# Patient Record
Sex: MALE | Race: WHITE | Employment: UNEMPLOYED | URBAN - METROPOLITAN AREA
[De-identification: names, ages, dates, MRNs, and addresses within clinical notes are randomized per-mention and may not be internally consistent; named-entity substitution may affect disease eponyms.]

---

## 2018-05-25 ENCOUNTER — TELEPHONE (OUTPATIENT)
Dept: INTERNAL MEDICINE CLINIC | Age: 53
End: 2018-05-25

## 2018-05-25 DIAGNOSIS — Z00.00 PHYSICAL EXAM: Primary | ICD-10-CM

## 2018-07-31 ENCOUNTER — HOSPITAL ENCOUNTER (OUTPATIENT)
Dept: LAB | Age: 53
Discharge: HOME | End: 2018-07-31
Payer: COMMERCIAL

## 2018-07-31 DIAGNOSIS — Z00.00 PHYSICAL EXAM: ICD-10-CM

## 2018-07-31 LAB
ALBUMIN SERPL-MCNC: 4 G/DL (ref 3.4–5)
ALBUMIN/GLOB SERPL: 1.3 {RATIO} (ref 0.8–1.7)
ALP SERPL-CCNC: 69 U/L (ref 45–117)
ALT SERPL-CCNC: 32 U/L (ref 16–61)
ANION GAP SERPL CALC-SCNC: 6 MMOL/L (ref 3–18)
AST SERPL-CCNC: 15 U/L (ref 15–37)
BILIRUB SERPL-MCNC: 0.5 MG/DL (ref 0.2–1)
BUN SERPL-MCNC: 24 MG/DL (ref 7–18)
BUN/CREAT SERPL: 22 (ref 12–20)
CALCIUM SERPL-MCNC: 8.9 MG/DL (ref 8.5–10.1)
CHLORIDE SERPL-SCNC: 106 MMOL/L (ref 100–108)
CHOLEST SERPL-MCNC: 187 MG/DL
CO2 SERPL-SCNC: 28 MMOL/L (ref 21–32)
CREAT SERPL-MCNC: 1.08 MG/DL (ref 0.6–1.3)
ERYTHROCYTE [DISTWIDTH] IN BLOOD BY AUTOMATED COUNT: 13.2 % (ref 11.6–14.5)
GLOBULIN SER CALC-MCNC: 3.1 G/DL (ref 2–4)
GLUCOSE SERPL-MCNC: 88 MG/DL (ref 74–99)
HCT VFR BLD AUTO: 41.9 % (ref 36–48)
HDLC SERPL-MCNC: 43 MG/DL (ref 40–60)
HDLC SERPL: 4.3 {RATIO} (ref 0–5)
HGB BLD-MCNC: 15.1 G/DL (ref 13–16)
LDLC SERPL CALC-MCNC: 124.8 MG/DL (ref 0–100)
LIPID PROFILE,FLP: ABNORMAL
MCH RBC QN AUTO: 30.8 PG (ref 24–34)
MCHC RBC AUTO-ENTMCNC: 36 G/DL (ref 31–37)
MCV RBC AUTO: 85.5 FL (ref 74–97)
PLATELET # BLD AUTO: 267 K/UL (ref 135–420)
PMV BLD AUTO: 10.5 FL (ref 9.2–11.8)
POTASSIUM SERPL-SCNC: 4.3 MMOL/L (ref 3.5–5.5)
PROT SERPL-MCNC: 7.1 G/DL (ref 6.4–8.2)
RBC # BLD AUTO: 4.9 M/UL (ref 4.7–5.5)
SODIUM SERPL-SCNC: 140 MMOL/L (ref 136–145)
TRIGL SERPL-MCNC: 96 MG/DL (ref ?–150)
VLDLC SERPL CALC-MCNC: 19.2 MG/DL
WBC # BLD AUTO: 6.2 K/UL (ref 4.6–13.2)

## 2018-07-31 PROCEDURE — 36415 COLL VENOUS BLD VENIPUNCTURE: CPT | Performed by: INTERNAL MEDICINE

## 2018-07-31 PROCEDURE — 80053 COMPREHEN METABOLIC PANEL: CPT | Performed by: INTERNAL MEDICINE

## 2018-07-31 PROCEDURE — 85027 COMPLETE CBC AUTOMATED: CPT | Performed by: INTERNAL MEDICINE

## 2018-07-31 PROCEDURE — 80061 LIPID PANEL: CPT | Performed by: INTERNAL MEDICINE

## 2018-08-10 ENCOUNTER — OFFICE VISIT (OUTPATIENT)
Dept: INTERNAL MEDICINE CLINIC | Age: 53
End: 2018-08-10

## 2018-08-10 VITALS
WEIGHT: 169 LBS | BODY MASS INDEX: 25.61 KG/M2 | HEIGHT: 68 IN | RESPIRATION RATE: 14 BRPM | HEART RATE: 68 BPM | DIASTOLIC BLOOD PRESSURE: 82 MMHG | SYSTOLIC BLOOD PRESSURE: 112 MMHG | TEMPERATURE: 98.2 F | OXYGEN SATURATION: 98 %

## 2018-08-10 DIAGNOSIS — F43.9 STRESS: ICD-10-CM

## 2018-08-10 DIAGNOSIS — D12.6 COLON ADENOMA: ICD-10-CM

## 2018-08-10 DIAGNOSIS — E55.9 HYPOVITAMINOSIS D: ICD-10-CM

## 2018-08-10 DIAGNOSIS — I10 PRIMARY HYPERTENSION: Primary | ICD-10-CM

## 2018-08-10 DIAGNOSIS — N52.9 ERECTILE DYSFUNCTION, UNSPECIFIED ERECTILE DYSFUNCTION TYPE: ICD-10-CM

## 2018-08-10 DIAGNOSIS — R97.20 ELEVATED PROSTATE SPECIFIC ANTIGEN (PSA): ICD-10-CM

## 2018-08-10 DIAGNOSIS — E66.3 OVERWEIGHT (BMI 25.0-29.9): ICD-10-CM

## 2018-08-10 RX ORDER — ACYCLOVIR 800 MG/1
800 TABLET ORAL AS NEEDED
COMMUNITY
End: 2019-08-05 | Stop reason: SDUPTHER

## 2018-08-10 RX ORDER — BISMUTH SUBSALICYLATE 262 MG
1 TABLET,CHEWABLE ORAL DAILY
COMMUNITY

## 2018-08-10 RX ORDER — TADALAFIL 20 MG/1
20 TABLET ORAL AS NEEDED
Qty: 30 TAB | Refills: 11 | Status: SHIPPED | OUTPATIENT
Start: 2018-08-10

## 2018-08-10 RX ORDER — LORAZEPAM 1 MG/1
1 TABLET ORAL
Qty: 30 TAB | Refills: 1 | Status: SHIPPED | OUTPATIENT
Start: 2018-08-10 | End: 2019-02-02 | Stop reason: SDUPTHER

## 2018-08-10 NOTE — PROGRESS NOTES
48 y.o. WHITE OR  male who presents for evaluation. He is transferring care from Dr Yash Forde    Reports being dx'ed w HTN the last year or so and his readings have been controlled on armida and norvasc. No sfx. Remains active w a lot of walking with his job although not much other exercise    He has not been treated for his cholesterol, just doing dietary measures    He is doing the intermittent fasting after hearing about it from his wife and weight is down about 8 lbs on his scale    Vitals 8/10/2018 2/6/2018 12/1/2014   Weight 169 lb 180 lb 180 lb     No gi complaints. No gu complaints. His prostate is being followed currently by Dr Yuly Alcaraz. Wants refill of the cialis    Uses the ativan prn stress, very sparingly    Past Medical History:   Diagnosis Date    Colon adenoma 05/2017    Dr Talat Blank Elevated PSA     Dr Hamilton Stage Erectile dysfunction     viagra ineffective    HSV infection     Hyperlipidemia     calculated 10 yr risk score 3.8% (8/18)    Hypertension 2017    Hypovitaminosis D     Overweight (BMI 25.0-29. 9)     IF 6/18 start weight 177 lbs    Seizures ([address]     preteens; resolved     Past Surgical History:   Procedure Laterality Date    HX COLONOSCOPY      Dr Hema Harvey 5/30/17 adenoma    HX HEENT      Lasik early 2000s    HX ORTHOPAEDIC      Right bicep surgery 2008, Left bicep surgery 2011 Dr Stacie Reyes Marital status:      Spouse name: N/A    Number of children: 2    Years of education: N/A     Occupational History    physical science tech [employer]     Social History Main Topics    Smoking status: Never Smoker    Smokeless tobacco: Never Used    Alcohol use Yes      Comment: social    Drug use: No    Sexual activity: Yes     Partners: Female     Other Topics Concern    Not on file     Social History Narrative     Current Outpatient Prescriptions   Medication Sig    acyclovir (ZOVIRAX) 800 mg tablet Take 800 mg by mouth as needed.  LORazepam (ATIVAN) 1 mg tablet Take 1 Tab by mouth every eight (8) hours as needed for Anxiety. Max Daily Amount: 3 mg.  multivitamin (ONE A DAY) tablet Take 1 Tab by mouth daily.  tadalafil (CIALIS) 20 mg tablet Take 1 Tab by mouth as needed.  amLODIPine (NORVASC) 10 mg tablet Take 10 mg by mouth daily.  lisinopril (PRINIVIL, ZESTRIL) 10 mg tablet TK 1 T PO QD    VIT A/VIT C/VIT E/ZINC/COPPER (PRESERVISION AREDS PO) Take  by mouth.  ergocalciferol (ERGOCALCIFEROL) 50,000 unit capsule TK 1 C PO Q WK     No current facility-administered medications for this visit. Allergies   Allergen Reactions    Pcn [Penicillins] Hives    Vancomycin Swelling     REVIEW OF SYSTEMS: colo 2017 neg  Ophtho  no vision change or eye pain  Oral  no mouth pain, tongue or tooth problems  Ears  no hearing loss, ear pain, fullness, no swallowing problems  Cardiac  no CP, PND, orthopnea, edema, palpitations or syncope  Chest  no breast masses  Resp  no wheezing, chronic coughing, dyspnea  GI  no heartburn, nausea, vomiting, change in bowel habits, bleeding, hemorrhoids  Urinary  no dysuria, hematuria, flank pain, urgency, frequency  Genitals  no genital lesions, discharge, masses, ulceration, warts  Ortho  no swelling, dec ROM, myalgias  Derm  no nail abnormalities, rashes, lesions of note, hair loss  Psych  denies any anxiety or depression symptoms, no hallucinations or violent ideation  Constitutional  no wt loss, night sweats, unexplained fevers  Neuro  no focal weakness, numbness, paresthesias, incoordination, ataxia, involuntary movements  Endo - no polyuria, polydipsia, nocturia, hot flashes    Visit Vitals    /82    Pulse 68    Temp 98.2 °F (36.8 °C) (Oral)    Resp 14    Ht 5' 8\" (1.727 m)    Wt 169 lb (76.7 kg)    SpO2 98%    BMI 25.7 kg/m2   A&O x3  Affect is appropriate.   Mood stable  No apparent distress  Anicteric, no JVD, adenopathy or thyromegaly. No carotid bruits or radiated murmur  Lungs clear to auscultation, no wheezes or rales  Heart showed regular rate and rhythm. No murmur, rubs, gallops  Abdomen soft nontender, no hepatosplenomegaly or masses. Extremities without edema. Pulses 1-2+ symmetrically    LABS  From 9/13 showed   psa 1.85  From 9/14 showed   psa 1.70  From 11/15 showed psa 2.20, gluc 102, cr 1.10, gfr>60, alt 46, chol 206, tg 290, hdl 40, ldl-c 109, wbc 6.3, hb 16.6, plt 294, tsh 1.62, ft4 1.30, vit d 48.7    Results for orders placed or performed during the hospital encounter of 07/31/18   CBC W/O DIFF   Result Value Ref Range    WBC 6.2 4.6 - 13.2 K/uL    RBC 4.90 4.70 - 5.50 M/uL    HGB 15.1 13.0 - 16.0 g/dL    HCT 41.9 36.0 - 48.0 %    MCV 85.5 74.0 - 97.0 FL    MCH 30.8 24.0 - 34.0 PG    MCHC 36.0 31.0 - 37.0 g/dL    RDW 13.2 11.6 - 14.5 %    PLATELET 329 224 - 677 K/uL    MPV 10.5 9.2 - 11.8 FL   LIPID PANEL   Result Value Ref Range    LIPID PROFILE          Cholesterol, total 187 <200 MG/DL    Triglyceride 96 <150 MG/DL    HDL Cholesterol 43 40 - 60 MG/DL    LDL, calculated 124.8 (H) 0 - 100 MG/DL    VLDL, calculated 19.2 MG/DL    CHOL/HDL Ratio 4.3 0 - 5.0     METABOLIC PANEL, COMPREHENSIVE   Result Value Ref Range    Sodium 140 136 - 145 mmol/L    Potassium 4.3 3.5 - 5.5 mmol/L    Chloride 106 100 - 108 mmol/L    CO2 28 21 - 32 mmol/L    Anion gap 6 3.0 - 18 mmol/L    Glucose 88 74 - 99 mg/dL    BUN 24 (H) 7.0 - 18 MG/DL    Creatinine 1.08 0.6 - 1.3 MG/DL    BUN/Creatinine ratio 22 (H) 12 - 20      GFR est AA >60 >60 ml/min/1.73m2    GFR est non-AA >60 >60 ml/min/1.73m2    Calcium 8.9 8.5 - 10.1 MG/DL    Bilirubin, total 0.5 0.2 - 1.0 MG/DL    ALT (SGPT) 32 16 - 61 U/L    AST (SGOT) 15 15 - 37 U/L    Alk.  phosphatase 69 45 - 117 U/L    Protein, total 7.1 6.4 - 8.2 g/dL    Albumin 4.0 3.4 - 5.0 g/dL    Globulin 3.1 2.0 - 4.0 g/dL    A-G Ratio 1.3 0.8 - 1.7       Patient Active Problem List   Diagnosis Code    Elevated prostate specific antigen (PSA) R97.20    ED (erectile dysfunction) N52.9    Overweight (BMI 25.0-29. 9) E66.3    Primary hypertension I10    Hypovitaminosis D E55.9    Colon adenoma D12.6     Assessment and plan:  1. Elevated psa. F/U urology  2. ED. Script and website for [employer] given  3. Overweight. .intermittent fasting as he is doing  4. HTN. Continue current regimen. 5. Hypovit d. Check next draw  6. Colon adenoma. Fiber, colo 2022      RTC 8/19    Above conditions discussed at length and patient vocalized understanding.   All questions answered to patient satisfaction

## 2018-08-10 NOTE — PROGRESS NOTES
1. Have you been to the ER, urgent care clinic or hospitalized since your last visit? NO.     2. Have you seen or consulted any other health care providers outside of the [facility] since your last visit (Include any pap smears or colon screening)? NO      Do you have an Advanced Directive? YES    Would you like information on Advanced Directives?  NO    Chief Complaint   Patient presents with   [address]     with labs

## 2018-12-03 RX ORDER — LISINOPRIL 10 MG/1
10 TABLET ORAL DAILY
Qty: 90 TAB | Refills: 3 | Status: SHIPPED | OUTPATIENT
Start: 2018-12-03 | End: 2019-05-19 | Stop reason: SDUPTHER

## 2018-12-03 RX ORDER — AMLODIPINE BESYLATE 10 MG/1
10 TABLET ORAL DAILY
Qty: 90 TAB | Refills: 3 | Status: SHIPPED | OUTPATIENT
Start: 2018-12-03 | End: 2020-04-20 | Stop reason: SDUPTHER

## 2018-12-03 NOTE — TELEPHONE ENCOUNTER
Last Visit: 08/10/2018 with MD Susan Archuleta  Next Appointment: RTC 08/19    Requested Prescriptions     Pending Prescriptions Disp Refills    amLODIPine (NORVASC) 10 mg tablet 90 Tab 3     Sig: Take 1 Tab by mouth daily.  lisinopril (PRINIVIL, ZESTRIL) 10 mg tablet 90 Tab 3     Sig: Take 1 Tab by mouth daily.

## 2019-02-02 DIAGNOSIS — F43.9 STRESS: ICD-10-CM

## 2019-02-04 NOTE — TELEPHONE ENCOUNTER
Last ov 8/10/18  Last fill 8/10/18       Reviewed report generated by the [address] [place] [address]. Does not demonstrate aberrancies or inconsistencies with regard to the prescribing of controlled medications to this patient by other providers.

## 2019-02-05 RX ORDER — LORAZEPAM 1 MG/1
1 TABLET ORAL
Qty: 30 TAB | Refills: 1 | OUTPATIENT
Start: 2019-02-05 | End: 2019-02-07 | Stop reason: CLARIF

## 2019-02-07 RX ORDER — LORAZEPAM 0.5 MG/1
1 TABLET ORAL
Qty: 60 TAB | Refills: 0 | Status: SHIPPED | OUTPATIENT
Start: 2019-02-07 | End: 2020-02-17 | Stop reason: SDUPTHER

## 2019-05-20 RX ORDER — LISINOPRIL 10 MG/1
10 TABLET ORAL DAILY
Qty: 90 TAB | Refills: 3 | Status: SHIPPED | OUTPATIENT
Start: 2019-05-20 | End: 2020-04-20

## 2019-08-05 NOTE — TELEPHONE ENCOUNTER
----- Message from Sruthi Holder. Juan Fleming sent at 8/4/2019  9:32 AM EDT -----  Regarding: Prescription Question  Contact: [phone]  I need a refill of my Acyclovir 800 mg. I have 5 pills left. Can I have the refill sent to the [place] [facility] [address]. Their # is [phone]. If for some reason it can not be refilled can you please call me at [phone].   Thank you

## 2019-08-06 RX ORDER — ACYCLOVIR 800 MG/1
TABLET ORAL
Qty: 30 TAB | Refills: 2 | Status: SHIPPED | OUTPATIENT
Start: 2019-08-06

## 2019-10-30 ENCOUNTER — TELEPHONE (OUTPATIENT)
Dept: INTERNAL MEDICINE CLINIC | Age: 54
End: 2019-10-30

## 2019-10-30 DIAGNOSIS — Z00.00 PHYSICAL EXAM: Primary | ICD-10-CM

## 2019-10-30 NOTE — TELEPHONE ENCOUNTER
Pt sched cpe for 12/24- he will be getting labs done at [facility]  Could you put order in system for him

## 2019-12-14 ENCOUNTER — HOSPITAL ENCOUNTER (OUTPATIENT)
Dept: LAB | Age: 54
Discharge: HOME | End: 2019-12-14
Payer: COMMERCIAL

## 2019-12-14 DIAGNOSIS — Z00.00 PHYSICAL EXAM: ICD-10-CM

## 2019-12-14 LAB
ALBUMIN SERPL-MCNC: 4.2 G/DL (ref 3.4–5)
ALBUMIN/GLOB SERPL: 1.3 {RATIO} (ref 0.8–1.7)
ALP SERPL-CCNC: 82 U/L (ref 45–117)
ALT SERPL-CCNC: 50 U/L (ref 16–61)
ANION GAP SERPL CALC-SCNC: 6 MMOL/L (ref 3–18)
AST SERPL-CCNC: 21 U/L (ref 10–38)
BILIRUB SERPL-MCNC: 0.7 MG/DL (ref 0.2–1)
BUN SERPL-MCNC: 23 MG/DL (ref 7–18)
BUN/CREAT SERPL: 21 (ref 12–20)
CALCIUM SERPL-MCNC: 9.3 MG/DL (ref 8.5–10.1)
CHLORIDE SERPL-SCNC: 107 MMOL/L (ref 100–111)
CHOLEST SERPL-MCNC: 221 MG/DL
CO2 SERPL-SCNC: 26 MMOL/L (ref 21–32)
CREAT SERPL-MCNC: 1.08 MG/DL (ref 0.6–1.3)
ERYTHROCYTE [DISTWIDTH] IN BLOOD BY AUTOMATED COUNT: 13 % (ref 11.6–14.5)
GLOBULIN SER CALC-MCNC: 3.2 G/DL (ref 2–4)
GLUCOSE SERPL-MCNC: 103 MG/DL (ref 74–99)
HCT VFR BLD AUTO: 45.4 % (ref 36–48)
HDLC SERPL-MCNC: 42 MG/DL (ref 40–60)
HDLC SERPL: 5.3 {RATIO} (ref 0–5)
HGB BLD-MCNC: 16.3 G/DL (ref 13–16)
LDLC SERPL CALC-MCNC: 150.8 MG/DL (ref 0–100)
LIPID PROFILE,FLP: ABNORMAL
MCH RBC QN AUTO: 31.6 PG (ref 24–34)
MCHC RBC AUTO-ENTMCNC: 35.9 G/DL (ref 31–37)
MCV RBC AUTO: 88 FL (ref 74–97)
PLATELET # BLD AUTO: 300 K/UL (ref 135–420)
PMV BLD AUTO: 10.6 FL (ref 9.2–11.8)
POTASSIUM SERPL-SCNC: 4.8 MMOL/L (ref 3.5–5.5)
PROT SERPL-MCNC: 7.4 G/DL (ref 6.4–8.2)
PSA SERPL-MCNC: 2.4 NG/ML (ref 0–4)
RBC # BLD AUTO: 5.16 M/UL (ref 4.7–5.5)
SODIUM SERPL-SCNC: 139 MMOL/L (ref 136–145)
TRIGL SERPL-MCNC: 141 MG/DL (ref ?–150)
VLDLC SERPL CALC-MCNC: 28.2 MG/DL
WBC # BLD AUTO: 6.2 K/UL (ref 4.6–13.2)

## 2019-12-14 PROCEDURE — 80053 COMPREHEN METABOLIC PANEL: CPT

## 2019-12-14 PROCEDURE — 36415 COLL VENOUS BLD VENIPUNCTURE: CPT

## 2019-12-14 PROCEDURE — 80061 LIPID PANEL: CPT

## 2019-12-14 PROCEDURE — 85027 COMPLETE CBC AUTOMATED: CPT

## 2019-12-14 PROCEDURE — 84153 ASSAY OF PSA TOTAL: CPT

## 2019-12-23 NOTE — PROGRESS NOTES
48 y.o. WHITE OR  male who presents for RPE    No cardiovascular complaints. Remains active at work although no set exercise. His bp has been controlled when he checks. He's doing 12 hour shifts frequently    He regained most of the weight as noted below as he came off the diet and fasting. He will restart after then holidays    Vitals 12/24/2019 12/10/2018 8/10/2018 2/6/2018 12/1/2014   Weight 182 lb 169 lb 169 lb 180 lb 180 lb     No gi complaints. No gu complaints. His prostate is going to be followed by Dr Ace Factor now    Uses the ativan prn stress, very sparingly    Past Medical History:   Diagnosis Date    Colon adenoma 05/2017    Dr Cale Ha Elevated PSA     Dr Sho Mcdaniels Erectile dysfunction     viagra ineffective    HSV infection     Hyperlipidemia     calculated 10 yr risk score 3.8% (8/18)    Hypertension 2017    Hypovitaminosis D     Overweight (BMI 25.0-29. 9)     IF 6/18 start weight 177 lbs    Seizures ([address]     preteens; resolved     Past Surgical History:   Procedure Laterality Date    HX COLONOSCOPY      Dr Monster Barreto 5/30/17 adenoma    HX HEENT      Lasik early 2000s    HX ORTHOPAEDIC      Right bicep surgery 2008, Left bicep surgery 2011 Dr Adam Alva History     Socioeconomic History    Marital status:      Spouse name: Not on file    Number of children: 2    Years of education: Not on file    Highest education level: Not on file   Occupational History    Occupation: physical science tech     Employer: [employer]   Social Needs    Financial resource strain: Not on file    Food insecurity:     Worry: Not on file     Inability: Not on file    Transportation needs:     Medical: Not on file     Non-medical: Not on file   Tobacco Use    Smoking status: Never Smoker    Smokeless tobacco: Never Used   Substance and Sexual Activity    Alcohol use: Yes     Comment: social    Drug use: No    Sexual activity: Yes     Partners: Female Lifestyle    Physical activity:     Days per week: Not on file     Minutes per session: Not on file    Stress: Not on file   Relationships    Social connections:     Talks on phone: Not on file     Gets together: Not on file     Attends [employer] service: Not on file     Active member of club or organization: Not on file     Attends meetings of clubs or organizations: Not on file     Relationship status: Not on file    Intimate partner violence:     Fear of current or ex partner: Not on file     Emotionally abused: Not on file     Physically abused: Not on file     Forced sexual activity: Not on file   Other Topics Concern    Not on file   Social History Narrative    Not on file     Current Outpatient Medications   Medication Sig    cholecalciferol, vitamin D3, (VITAMIN D3) 2,000 unit tab Take 2,000 Units by mouth daily.  acyclovir (ZOVIRAX) 800 mg tablet Twice daily as needed for 5 days    lisinopril (PRINIVIL, ZESTRIL) 10 mg tablet Take 1 Tab by mouth daily.  LORazepam (ATIVAN) 0.5 mg tablet Take 2 Tabs by mouth every eight (8) hours as needed for Anxiety. Max Daily Amount: 3 mg.  amLODIPine (NORVASC) 10 mg tablet Take 1 Tab by mouth daily.  multivitamin (ONE A DAY) tablet Take 1 Tab by mouth daily.  tadalafil (CIALIS) 20 mg tablet Take 1 Tab by mouth as needed.  VIT A/VIT C/VIT E/ZINC/COPPER (PRESERVISION AREDS PO) Take  by mouth.  ergocalciferol (ERGOCALCIFEROL) 50,000 unit capsule TK 1 C PO Q WK     No current facility-administered medications for this visit.       Allergies   Allergen Reactions    Pcn [Penicillins] Hives    Vancomycin Swelling     REVIEW OF SYSTEMS: colo 2017 neg  Ophtho  no vision change or eye pain  Oral  no mouth pain, tongue or tooth problems  Ears  no hearing loss, ear pain, fullness, no swallowing problems  Cardiac  no CP, PND, orthopnea, edema, palpitations or syncope  Chest  no breast masses  Resp  no wheezing, chronic coughing, dyspnea  GI  no heartburn, nausea, vomiting, change in bowel habits, bleeding, hemorrhoids  Urinary  no dysuria, hematuria, flank pain, urgency, frequency  Genitals  no genital lesions, discharge, masses, ulceration, warts  Ortho  no swelling, dec ROM, myalgias    Visit Vitals  /80   Pulse 90   Temp 98.6 °F (37 °C) (Oral)   Resp 14   Ht 5' 8\" (1.727 m)   Wt 182 lb (82.6 kg)   SpO2 97%   BMI 27.67 kg/m²   A&O x3  Affect is appropriate. Mood stable  No apparent distress  Anicteric, no JVD, adenopathy or thyromegaly. No carotid bruits or radiated murmur  Lungs clear to auscultation, no wheezes or rales  Heart showed regular rate and rhythm. No murmur, rubs, gallops  Abdomen soft nontender, no hepatosplenomegaly or masses. Extremities without edema.   Pulses 1-2+ symmetrically    LABS  From 9/13 showed   psa 1.85  From 9/14 showed   psa 1.70  From 11/15 showed psa 2.20, gluc 102, cr 1.10, gfr>60, alt 46, chol 206, tg 290, hdl 40, ldl-c 109, wbc 6.3, hb 16.6, plt 294, tsh 1.62, ft4 1.30, vit d 48.7  From 8/18 showed     gluc 88,   cr 1.08, gfr>60, alt 32,       wbc 6.2, hb 15.1, plt 267    Results for orders placed or performed during the hospital encounter of 12/14/19   CBC W/O DIFF   Result Value Ref Range    WBC 6.2 4.6 - 13.2 K/uL    RBC 5.16 4.70 - 5.50 M/uL    HGB 16.3 (H) 13.0 - 16.0 g/dL    HCT 45.4 36.0 - 48.0 %    MCV 88.0 74.0 - 97.0 FL    MCH 31.6 24.0 - 34.0 PG    MCHC 35.9 31.0 - 37.0 g/dL    RDW 13.0 11.6 - 14.5 %    PLATELET 557 859 - 499 K/uL    MPV 10.6 9.2 - 11.8 FL   LIPID PANEL   Result Value Ref Range    LIPID PROFILE          Cholesterol, total 221 (H) <200 MG/DL    Triglyceride 141 <150 MG/DL    HDL Cholesterol 42 40 - 60 MG/DL    LDL, calculated 150.8 (H) 0 - 100 MG/DL    VLDL, calculated 28.2 MG/DL    CHOL/HDL Ratio 5.3 (H) 0 - 5.0     METABOLIC PANEL, COMPREHENSIVE   Result Value Ref Range    Sodium 139 136 - 145 mmol/L    Potassium 4.8 3.5 - 5.5 mmol/L    Chloride 107 100 - 111 mmol/L    CO2 26 21 - 32 mmol/L    Anion gap 6 3.0 - 18 mmol/L    Glucose 103 (H) 74 - 99 mg/dL    BUN 23 (H) 7.0 - 18 MG/DL    Creatinine 1.08 0.6 - 1.3 MG/DL    BUN/Creatinine ratio 21 (H) 12 - 20      GFR est AA >60 >60 ml/min/1.73m2    GFR est non-AA >60 >60 ml/min/1.73m2    Calcium 9.3 8.5 - 10.1 MG/DL    Bilirubin, total 0.7 0.2 - 1.0 MG/DL    ALT (SGPT) 50 16 - 61 U/L    AST (SGOT) 21 10 - 38 U/L    Alk. phosphatase 82 45 - 117 U/L    Protein, total 7.4 6.4 - 8.2 g/dL    Albumin 4.2 3.4 - 5.0 g/dL    Globulin 3.2 2.0 - 4.0 g/dL    A-G Ratio 1.3 0.8 - 1.7     PSA, DIAGNOSTIC (PROSTATE SPECIFIC AG)   Result Value Ref Range    Prostate Specific Ag 2.4 0.0 - 4.0 ng/mL     Patient Active Problem List   Diagnosis Code    Elevated prostate specific antigen (PSA) R97.20    ED (erectile dysfunction) N52.9    Overweight (BMI 25.0-29. 9) E66.3    Primary hypertension I10    Vitamin D deficiency E55.9    Colon adenoma D12.6    IFG (impaired fasting glucose) R73.01    Hyperlipidemia E78.5     Assessment and plan:  1. Elevated psa. F/U Dr Chrissie Zavala  2. ED. Prn meds  3. Overweight. Restart diet and fasting regimen  4. HTN. Continue current. 5. Hypovit d. Check next draw  6. Colon adenoma. Fiber, colo 2022 at [facility] [address] per his request  7. Tdap given, shingrix advocated        RTC 12/20    Above conditions discussed at length and patient vocalized understanding.   All questions answered to patient satisfaction

## 2019-12-24 ENCOUNTER — OFFICE VISIT (OUTPATIENT)
Dept: INTERNAL MEDICINE CLINIC | Age: 54
End: 2019-12-24

## 2019-12-24 VITALS
BODY MASS INDEX: 27.58 KG/M2 | SYSTOLIC BLOOD PRESSURE: 110 MMHG | HEART RATE: 90 BPM | WEIGHT: 182 LBS | HEIGHT: 68 IN | RESPIRATION RATE: 14 BRPM | OXYGEN SATURATION: 97 % | TEMPERATURE: 98.6 F | DIASTOLIC BLOOD PRESSURE: 80 MMHG

## 2019-12-24 DIAGNOSIS — E78.5 HYPERLIPIDEMIA, UNSPECIFIED HYPERLIPIDEMIA TYPE: ICD-10-CM

## 2019-12-24 DIAGNOSIS — E66.3 OVERWEIGHT (BMI 25.0-29.9): ICD-10-CM

## 2019-12-24 DIAGNOSIS — D12.6 COLON ADENOMA: ICD-10-CM

## 2019-12-24 DIAGNOSIS — I10 PRIMARY HYPERTENSION: ICD-10-CM

## 2019-12-24 DIAGNOSIS — Z23 ENCOUNTER FOR IMMUNIZATION: ICD-10-CM

## 2019-12-24 DIAGNOSIS — R97.20 ELEVATED PROSTATE SPECIFIC ANTIGEN (PSA): ICD-10-CM

## 2019-12-24 DIAGNOSIS — E55.9 VITAMIN D DEFICIENCY: ICD-10-CM

## 2019-12-24 DIAGNOSIS — Z00.00 PHYSICAL EXAM: Primary | ICD-10-CM

## 2019-12-24 DIAGNOSIS — R73.01 IFG (IMPAIRED FASTING GLUCOSE): ICD-10-CM

## 2019-12-24 RX ORDER — CHOLECALCIFEROL (VITAMIN D3) 125 MCG
2000 CAPSULE ORAL DAILY
COMMUNITY

## 2019-12-24 NOTE — PATIENT INSTRUCTIONS
Vaccine Information Statement Tdap (Tetanus, Diphtheria, Pertussis) Vaccine: What You Need to Know Many Vaccine Information Statements are available in Indonesian and other languages. See www.immunize.org/vis. Hojas de Información Sobre Vacunas están disponibles en español y en muchos otros idiomas. Visite Lydia[employer].si 1. Why get vaccinated? Tetanus, diphtheria, and pertussis are very serious diseases. Tdap vaccine can protect us from these diseases. And, Tdap vaccine given to pregnant women can protect  babies against pertussis. TETANUS (Lockjaw) is rare in the [facility] today. It causes painful muscle tightening and stiffness, usually all over the body. ? It can lead to tightening of muscles in the head and neck so you cant open your mouth, swallow, or sometimes even breathe. Tetanus kills about 1 out of 10 people who are infected even after receiving the best medical care. DIPHTHERIA is also rare in the [facility] today. It can cause a thick coating to form in the back of the throat. ? It can lead to breathing problems, heart failure, paralysis, and death. PERTUSSIS (Whooping Cough) causes severe coughing spells, which can cause difficulty breathing, vomiting, and disturbed sleep. ? It can also lead to weight loss, incontinence, and rib fractures. Up to 2 in 100 adolescents and 5 in 100 adults with pertussis are hospitalized or have complications, which could include pneumonia or death. These diseases are caused by bacteria. Diphtheria and pertussis are spread from person to person through secretions from coughing or sneezing. Tetanus enters the body through cuts, scratches, or wounds. Before vaccines, as many as 200,000 cases of diphtheria, 200,000 cases of pertussis, and hundreds of cases of tetanus, were reported in the [place] each year.  Since vaccination began, reports of cases for tetanus and diphtheria have dropped by about 99% and for pertussis by about 80%. 2. Tdap vaccine Tdap vaccine can protect adolescents and adults from tetanus, diphtheria, and pertussis. One dose of Tdap is routinely given at age 6 or 15. People who did not get Tdap at that age should get it as soon as possible. Tdap is especially important for health care professionals and anyone having close contact with a baby younger than 12 months. Pregnant women should get a dose of Tdap during every pregnancy, to protect the  from pertussis. Infants are most at risk for severe, life-threatening complications from pertussis. Another vaccine, called Td, protects against tetanus and diphtheria, but not pertussis. A Td booster should be given every 10 years. Tdap may be given as one of these boosters if you have never gotten Tdap before. Tdap may also be given after a severe cut or burn to prevent tetanus infection. Your doctor or the person giving you the vaccine can give you more information. Tdap may safely be given at the same time as other vaccines. 3. Some people should not get this vaccine  A person who has ever had a life-threatening allergic reaction after a previous dose of any diphtheria, tetanus or pertussis containing vaccine, OR has a severe allergy to any part of this vaccine, should not get Tdap vaccine. Tell the person giving the vaccine about any severe allergies.  Anyone who had coma or long repeated seizures within 7 days after a childhood dose of DTP or DTaP, or a previous dose of Tdap, should not get Tdap, unless a cause other than the vaccine was found. They can still get Td.  Talk to your doctor if you: 
- have seizures or another nervous system problem, 
- had severe pain or swelling after any vaccine containing diphtheria, tetanus or pertussis,  
- ever had a condition called Guillain Barré Syndrome (GBS), 
- arent feeling well on the day the shot is scheduled. 4. Risks With any medicine, including vaccines, there is a chance of side effects. These are usually mild and go away on their own. Serious reactions are also possible but are rare. Most people who get Tdap vaccine do not have any problems with it. Mild Problems following Tdap 
(Did not interfere with activities)  Pain where the shot was given (about 3 in 4 adolescents or 2 in 3 adults)  Redness or swelling where the shot was given (about 1 person in 5)  Mild fever of at least 100.4°F (up to about 1 in 25 adolescents or 1 in 100 adults)  Headache (about 3 or 4 people in 10)  Tiredness (about 1 person in 3 or 4)  Nausea, vomiting, diarrhea, stomach ache (up to 1 in 4 adolescents or 1 in 10 adults)  Chills,  sore joints (about 1 person in 10)  Body aches (about 1 person in 3 or 4)  Rash, swollen glands (uncommon) Moderate Problems following Tdap (Interfered with activities, but did not require medical attention)  Pain where the shot was given (up to 1 in 5 or 6)  Redness or swelling where the shot was given (up to about 1 in 16 adolescents or 1 in 12 adults)  Fever over 102°F (about 1 in 100 adolescents or 1 in 250 adults)  Headache (about 1 in 7 adolescents or 1 in 10 adults)  Nausea, vomiting, diarrhea, stomach ache (up to 1 or 3 people in 100)  Swelling of the entire arm where the shot was given (up to about 1 in 500). Severe Problems following Tdap 
(Unable to perform usual activities; required medical attention)  Swelling, severe pain, bleeding, and redness in the arm where the shot was given (rare). Problems that could happen after any vaccine:  People sometimes faint after a medical procedure, including vaccination. Sitting or lying down for about 15 minutes can help prevent fainting, and injuries caused by a fall. Tell your doctor if you feel dizzy, or have vision changes or ringing in the ears.  Some people get severe pain in the shoulder and have difficulty moving the arm where a shot was given. This happens very rarely.  Any medication can cause a severe allergic reaction. Such reactions from a vaccine are very rare, estimated at fewer than 1 in a million doses, and would happen within a few minutes to a few hours after the vaccination. As with any medicine, there is a very remote chance of a vaccine causing a serious injury or death. The safety of vaccines is always being monitored. For more information, visit: www.cdc.gov/vaccinesafety/ 
 
 
The [facility] Vaccine Injury Compensation Program (VICP) is a federal program that was created to compensate people who may have been injured by certain vaccines. Persons who believe they may have been injured by a vaccine can learn about the program and about filing a claim by calling 3-470.397.1620 or visiting the [employer] website at www.[facility].gov/vaccinecompensation.  There is a time limit to file a claim for compensation. 7. How can I learn more?  Ask your doctor. He or she can give you the vaccine package insert or suggest other sources of information.  Call your local or state health department.  Contact the Centers for Disease Control and Prevention (CDC): 
- Call 7-468.600.3465 (1-800-CDC-INFO) or 
- Visit CDCs website at www.cdc.gov/vaccines Vaccine Information Statement Tdap Vaccine 
(2/24/2015) [address] 613YL-09 Department of Health and [employer] Centers for Disease Control and Prevention Office Use Only

## 2019-12-24 NOTE — PROGRESS NOTES
Valente Mandujano presents today for   Chief Complaint   Patient presents with    Physical     lab              Depression Screening:  3 most recent PHQ Screens 12/24/2019   Little interest or pleasure in doing things Not at all   Feeling down, depressed, irritable, or hopeless Not at all   Total Score PHQ 2 0       Learning Assessment:  Learning Assessment 8/10/2018   PRIMARY LEARNER Patient   HIGHEST LEVEL OF EDUCATION - PRIMARY LEARNER  GRADUATED HIGH SCHOOL OR GED   BARRIERS PRIMARY LEARNER NONE   PRIMARY LANGUAGE ENGLISH   LEARNER PREFERENCE PRIMARY READING   ANSWERED BY patient   RELATIONSHIP SELF       Abuse Screening:  No flowsheet data found. Fall Risk  No flowsheet data found. Coordination of Care:  1. Have you been to the ER, urgent care clinic since your last visit? Hospitalized since your last visit? no    2. Have you seen or consulted any other health care providers outside of the [address] [place] [address] since your last visit? Include any pap smears or colon screening. Marianne Mandujano is a 48 y.o. male who presents for routine immunizations. Per verbal order from [address] for TDAP (left deltoid)  He denies any symptoms , reactions or allergies that would exclude them from being immunized today. Risks and adverse reactions were discussed and the VIS was given to them. All questions were addressed. He was observed for 10 min post injection. There were no reactions observed.     Humaira Stephenson LPN

## 2020-02-17 DIAGNOSIS — F43.9 STRESS: ICD-10-CM

## 2020-02-17 NOTE — TELEPHONE ENCOUNTER
----- Message from Galo Garcia. Sisi Marquez sent at 2/16/2020 11:10 AM EST -----  Regarding: Prescription Question  Contact: [phone]  I need a refill of my Ativan (Lorazepam) 1mg RX but this program will not allow me to request a refill. Can I get a Refill please?

## 2020-02-18 RX ORDER — LORAZEPAM 0.5 MG/1
1 TABLET ORAL
Qty: 60 TAB | Refills: 0 | Status: SHIPPED | OUTPATIENT
Start: 2020-02-18

## 2020-04-20 RX ORDER — LISINOPRIL 10 MG/1
TABLET ORAL
Qty: 90 TAB | Refills: 3 | Status: SHIPPED | OUTPATIENT
Start: 2020-04-20

## 2020-04-20 RX ORDER — AMLODIPINE BESYLATE 10 MG/1
10 TABLET ORAL DAILY
Qty: 90 TAB | Refills: 3 | Status: SHIPPED | OUTPATIENT
Start: 2020-04-20

## 2020-09-10 ENCOUNTER — TELEPHONE (OUTPATIENT)
Dept: INTERNAL MEDICINE CLINIC | Age: 55
End: 2020-09-10

## 2020-09-10 NOTE — TELEPHONE ENCOUNTER
Pt called to get copy of his immunization history. He wants to pick it up today.   I have printed it out and placed up at  for

## 2020-12-14 ENCOUNTER — TELEPHONE (OUTPATIENT)
Dept: INTERNAL MEDICINE CLINIC | Age: 55
End: 2020-12-14

## 2020-12-14 DIAGNOSIS — Z20.822 ENCOUNTER FOR LABORATORY TESTING FOR SEVERE ACUTE RESPIRATORY SYNDROME CORONAVIRUS 2 (SARS-COV-2): Primary | ICD-10-CM

## 2020-12-15 ENCOUNTER — CLINICAL SUPPORT (OUTPATIENT)
Dept: FAMILY MEDICINE CLINIC | Age: 55
End: 2020-12-15

## 2020-12-15 ENCOUNTER — HOSPITAL ENCOUNTER (OUTPATIENT)
Dept: LAB | Age: 55
Discharge: HOME | End: 2020-12-15
Payer: COMMERCIAL

## 2020-12-15 DIAGNOSIS — Z20.828 EXPOSURE TO SARS-ASSOCIATED CORONAVIRUS: Primary | ICD-10-CM

## 2020-12-15 DIAGNOSIS — Z20.822 ENCOUNTER FOR LABORATORY TESTING FOR SEVERE ACUTE RESPIRATORY SYNDROME CORONAVIRUS 2 (SARS-COV-2): ICD-10-CM

## 2020-12-15 PROCEDURE — 87635 SARS-COV-2 COVID-19 AMP PRB: CPT

## 2020-12-17 LAB — SARS-COV-2, COV2NT: NOT DETECTED

## 2020-12-19 ENCOUNTER — TELEPHONE (OUTPATIENT)
Dept: INTERNAL MEDICINE CLINIC | Age: 55
End: 2020-12-19

## 2020-12-21 NOTE — TELEPHONE ENCOUNTER
Patient contacted, patient identified with two identifiers (Name & ). Patient aware of results per DR. Cazares and verbalizes understanding.

## 2021-02-08 ENCOUNTER — DOCUMENTATION ONLY (OUTPATIENT)
Dept: INTERNAL MEDICINE CLINIC | Age: 56
End: 2021-02-08

## 2021-02-08 NOTE — PROGRESS NOTES
Pt has transferred care to [address] [place] [address], records request received which has been forwarded to Ci for processing

## 2021-03-03 NOTE — PROGRESS NOTES
Meir Huang  1965   Chief Complaint   Patient presents with    Shoulder Pain     Right shoulder        HISTORY OF PRESENT ILLNESS  Meir Huang is a 49 y.o. male who presents today for evaluation of right shoulder pain. He rates his pain 3/10 today. Pain has been present for a couple of months. No specific injury. Having pain in the top of the shoulder. Has trouble with lifting the arm up. Pain increased a few weeks ago after he was moving some furniture, has since improved some. Reports hx of surgery on both biceps tendons by myself around 2003 and 2009. No night pain. Patient describes the pain as dull that is Constant in nature. Symptoms are worse with stretching and bending, Activity and is better with  Rest. Associated symptoms include nothing. Since problem started, it: has slightly improved. Pain does not wake patient up at night. Has taken no meds for the problem. Has tried following treatments: Injections:NO; Brace:NO; Therapy:NO; Cane/Crutch:NO       Allergies   Allergen Reactions    Pcn [Penicillins] Hives    Vancomycin Swelling        Past Medical History:   Diagnosis Date    Colon adenoma 05/2017    Dr Jackie Padilla Elevated PSA     Dr Shaina Lawler Erectile dysfunction     viagra ineffective    HSV infection     Hyperlipidemia     calculated 10 yr risk score 3.8% (8/18)    Hypertension 2017    Hypovitaminosis D     Overweight (BMI 25.0-29. 9)     IF 6/18 start weight 177 lbs    Seizures ([address]     preteens; resolved      Social History     Socioeconomic History    Marital status:      Spouse name: Not on file    Number of children: 2    Years of education: Not on file    Highest education level: Not on file   Occupational History    Occupation: physical science tech     Employer: [employer]   Social Needs    Financial resource strain: Not on file    Food insecurity     Worry: Not on file     Inability: Not on file    Transportation needs     Medical: Not on file Non-medical: Not on file   Tobacco Use    Smoking status: Never Smoker    Smokeless tobacco: Never Used   Substance and Sexual Activity    Alcohol use: Yes     Comment: social    Drug use: No    Sexual activity: Yes     Partners: Female   Lifestyle    Physical activity     Days per week: Not on file     Minutes per session: Not on file    Stress: Not on file   Relationships    Social connections     Talks on phone: Not on file     Gets together: Not on file     Attends Congregational service: Not on file     Active member of club or organization: Not on file     Attends meetings of clubs or organizations: Not on file     Relationship status: Not on file    Intimate partner violence     Fear of current or ex partner: Not on file     Emotionally abused: Not on file     Physically abused: Not on file     Forced sexual activity: Not on file   Other Topics Concern    Not on file   Social History Narrative    Not on file      Past Surgical History:   Procedure Laterality Date    HX COLONOSCOPY      Dr Tomas Gutierrez 5/30/17 adenoma    HX HEENT      Lasik early 2000s    HX ORTHOPAEDIC      Right bicep surgery 2008, Left bicep surgery 2011 Dr Montague Solid History   Problem Relation Age of Onset    Hypertension Mother     Headache Mother     Diabetes Sister     Obesity Sister         Prader-Willi    Hypertension Brother     Depression Brother         suicide      Current Outpatient Medications   Medication Sig    lisinopriL (PRINIVIL, ZESTRIL) 10 mg tablet TAKE 1 TABLET BY MOUTH ONCE DAILY    amLODIPine (NORVASC) 10 mg tablet Take 1 Tab by mouth daily.  LORazepam (ATIVAN) 0.5 mg tablet Take 2 Tabs by mouth every eight (8) hours as needed for Anxiety. Max Daily Amount: 3 mg.  cholecalciferol, vitamin D3, (VITAMIN D3) 2,000 unit tab Take 2,000 Units by mouth daily.     acyclovir (ZOVIRAX) 800 mg tablet Twice daily as needed for 5 days    multivitamin (ONE A DAY) tablet Take 1 Tab by mouth daily.  tadalafil (CIALIS) 20 mg tablet Take 1 Tab by mouth as needed.  VIT A/VIT C/VIT E/ZINC/COPPER (PRESERVISION AREDS PO) Take  by mouth. No current facility-administered medications for this visit. REVIEW OF SYSTEM   Patient denies: Weight loss, Fever/Chills, HA, Visual changes, Fatigue, Chest pain, SOB, Abdominal pain, N/V/D/C, Blood in stool or urine, Edema. Pertinent positive as above in HPI. All others were negative    PHYSICAL EXAM:   Visit Vitals  /73 (BP 1 Location: Right upper arm, BP Patient Position: Sitting, BP Cuff Size: Large adult)   Pulse 75   Temp 97.8 °F (36.6 °C) (Temporal)   Resp 16   Ht 5' 8\" (1.727 m)   Wt 177 lb (80.3 kg)   SpO2 98%   BMI 26.91 kg/m²     The patient is a well-developed, well-nourished male   in no acute distress. The patient is alert and oriented times three. The patient is alert and oriented times three. Mood and affect are normal.  LYMPHATIC: lymph nodes are not enlarged and are within normal limits  SKIN: normal in color and non tender to palpation. There are no bruises or abrasions noted. NEUROLOGICAL: Motor sensory exam is within normal limits. Reflexes are equal bilaterally. There is normal sensation to pinprick and light touch  MUSCULOSKELETAL:  Examination Right shoulder   Skin Intact   AC joint tenderness +   Biceps tenderness -   Forward flexion/Elevation    Active abduction    Glenohumeral abduction 90   External rotation ROM 90   Internal rotation ROM 70   Apprehension -   Chriss Relocation -   Jerk -   Load and Shift -   Obriens -   Speeds -   Impingement sign -   Supraspinatus/Empty Can -, 5/5   External Rotation Strength -, 5/5   Lift Off/Belly Press -, 5/5   Neurovascular Intact       IMAGING: XR of right shoulder with 3 views obtained in the office dated 3/04/2021 was reviewed and read by Dr. Cheli Barnett: Mild degenerative changes in the [facility] joint. Sclerotic changes in the greater tuberosity.       IMPRESSION: ICD-10-CM ICD-9-CM    1. AC joint arthropathy  M19.019 719.91 meloxicam (Mobic) 15 mg tablet   2. Chronic right shoulder pain  M25.511 719.41 AMB POC XRAY, SHOULDER; COMPLETE, 2+    G89.29 338.29         PLAN:  1. Pt presents today with right shoulder pain due to [facility] joint arthropathy. Pain is not that bad today. Was given prescription for Mobic today to take as needed. Can return if symptoms worsen to discuss further treatment options such as a cortisone injection. Risk factors include: htn   2. No ultrasound exam indicated today  3. No cortisone injection indicated today   4. No Physical/Occupational Therapy indicated today  5. No diagnostic test indicated today:   6. No durable medical equipment indicated today  7. No referral indicated today   8. Yes medications indicated today: MOBIC  9. No Narcotic indicated today       RTC prn      Scribed by [address]) as dictated by Gabby Verde MD    I, Dr. Gabby Verde, confirm that all documentation is accurate.     Gabby Verde M.D.   [facility]

## 2021-03-04 ENCOUNTER — OFFICE VISIT (OUTPATIENT)
Dept: ORTHOPEDIC SURGERY | Age: 56
End: 2021-03-04
Payer: COMMERCIAL

## 2021-03-04 VITALS
TEMPERATURE: 97.8 F | RESPIRATION RATE: 16 BRPM | BODY MASS INDEX: 26.83 KG/M2 | SYSTOLIC BLOOD PRESSURE: 123 MMHG | OXYGEN SATURATION: 98 % | WEIGHT: 177 LBS | DIASTOLIC BLOOD PRESSURE: 73 MMHG | HEART RATE: 75 BPM | HEIGHT: 68 IN

## 2021-03-04 DIAGNOSIS — G89.29 CHRONIC RIGHT SHOULDER PAIN: ICD-10-CM

## 2021-03-04 DIAGNOSIS — M25.511 CHRONIC RIGHT SHOULDER PAIN: ICD-10-CM

## 2021-03-04 DIAGNOSIS — M19.019 AC JOINT ARTHROPATHY: Primary | ICD-10-CM

## 2021-03-04 PROCEDURE — 99203 OFFICE O/P NEW LOW 30 MIN: CPT | Performed by: ORTHOPAEDIC SURGERY

## 2021-03-04 PROCEDURE — 73030 X-RAY EXAM OF SHOULDER: CPT | Performed by: ORTHOPAEDIC SURGERY

## 2021-03-04 RX ORDER — MELOXICAM 15 MG/1
15 TABLET ORAL
Qty: 30 TAB | Refills: 1 | Status: SHIPPED | OUTPATIENT
Start: 2021-03-04

## 2022-03-07 ENCOUNTER — TELEPHONE (OUTPATIENT)
Dept: ORTHOPEDIC SURGERY | Age: 57
End: 2022-03-07

## 2022-03-07 NOTE — TELEPHONE ENCOUNTER
Patient has had two surgeries in the past on the shoulders. He reports his other physicians always ask if he has had any metal placed in either shoulder. He cannot recall if he does or not. Can we please advise.       Patient [phone]

## 2022-03-18 PROBLEM — R73.01 IFG (IMPAIRED FASTING GLUCOSE): Status: ACTIVE | Noted: 2019-12-24

## 2022-03-18 PROBLEM — E78.5 HYPERLIPIDEMIA: Status: ACTIVE | Noted: 2019-12-24

## 2022-03-19 PROBLEM — D12.6 COLON ADENOMA: Status: ACTIVE | Noted: 2018-08-10

## 2022-03-19 PROBLEM — I10 PRIMARY HYPERTENSION: Status: ACTIVE | Noted: 2018-08-10

## 2022-03-19 PROBLEM — E66.3 OVERWEIGHT (BMI 25.0-29.9): Status: ACTIVE | Noted: 2018-08-10

## 2022-03-20 PROBLEM — E55.9 VITAMIN D DEFICIENCY: Status: ACTIVE | Noted: 2018-08-10

## 2025-07-23 ENCOUNTER — HOSPITAL ENCOUNTER (OUTPATIENT)
Age: 60
Discharge: HOME | End: 2025-07-26
Payer: COMMERCIAL

## 2025-07-23 DIAGNOSIS — R97.20 ELEVATED PSA: ICD-10-CM

## 2025-07-23 LAB — CREAT UR-MCNC: 1.2 MG/DL (ref 0.6–1.3)

## 2025-07-23 PROCEDURE — 82565 ASSAY OF CREATININE: CPT

## 2025-07-23 PROCEDURE — 72197 MRI PELVIS W/O & W/DYE: CPT

## 2025-07-23 PROCEDURE — 6360000004 HC RX CONTRAST MEDICATION: Performed by: PHYSICIAN ASSISTANT

## 2025-07-23 PROCEDURE — A9577 INJ MULTIHANCE: HCPCS | Performed by: PHYSICIAN ASSISTANT

## 2025-07-23 RX ADMIN — GADOBENATE DIMEGLUMINE 20 ML: 529 INJECTION, SOLUTION INTRAVENOUS at 07:21
